# Patient Record
Sex: FEMALE | Race: WHITE | Employment: FULL TIME | URBAN - METROPOLITAN AREA
[De-identification: names, ages, dates, MRNs, and addresses within clinical notes are randomized per-mention and may not be internally consistent; named-entity substitution may affect disease eponyms.]

---

## 2020-12-21 ENCOUNTER — HOSPITAL ENCOUNTER (OUTPATIENT)
Dept: NUTRITION | Age: 30
End: 2020-12-21

## 2021-01-13 ENCOUNTER — HOSPITAL ENCOUNTER (OUTPATIENT)
Dept: NUTRITION | Age: 31
Discharge: HOME | End: 2021-01-13
Payer: COMMERCIAL

## 2021-01-13 PROCEDURE — 97802 MEDICAL NUTRITION INDIV IN: CPT

## 2021-01-13 NOTE — PROGRESS NOTES
..  [address] [place] [address]. [facility], [address] [place] [address]   Nutrition Assessment  Medical Nutrition Therapy   Outpatient Initial Evaluation         Patient Name: Liza Rivero : 1990   Treatment Diagnosis: Irregular menstruation, overweight   Referral Source: Dr. Severo Loser Start of Care [facility]): 2021     Gender: female Age: 27 y.o. Ht: 66 in Wt: 180  lb  kg   BMI: 29.1 BMR   Male  BMR Female [id number]     Past Medical History:  Chronic headaches for the past 1-2 years     Pertinent Medications:   Birth control pills     Biochemical Data:   n/a     Assessment:   The patient is here today for weight loss counseling. She is getting  in October and would like to lose weight before the wedding. She says she exercises 1-2 hours per day, 6-7 days per week (Insanity workout). She had tried portion control and cutting back on eating out in the past and lost weight, but she did not keep the weight off. She also c/o of chronic headaches in the afternoon. She has not seen a doctor about the headaches. She works for [employer] 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: Workout early 6-8 AM   Breakfast (8-9 AM): [employer] shake, [employer] coffee on the way to work (coffe with milk, cold brew or passion fruit guava drink)  Snack (10:30): banana, apple or sometimes something out of vending machine at work (chips)  Lunch (2-3 PM):  Broccoli slaw with chicken or salmon or veggie sub from [employer] (before 7): meat and vegetables   Beverages: 80-90 oz per day of water  Alcohol: socially (once a month)    Problem areas: Not eating enough food in general , not eating carbs, drinking calories in the morning, eating lunch too late       Estimate Needs   Calories: 1217-5325  Protein: 94 Carbs: 169 Fat: 50   Kcal/day  g/day  g/day  g/day        percent: 25  45  30               Nutrition Diagnosis   Overweight related to excessive energy intake as evidenced by BMI of 29.1 Nutrition Intervention &  Education: Educated patient on weight loss diet with plate method guidelines. Encouraged the patient to eat at least 3 times per day, spacing meals no more than 4-5 hours apart (2-3 hours in-between snacks). Discussed that 1/2 the plate should include non-starchy vegetables, 1/4 plate should be lean protein, and 1/4 of plate should be carbohydrate. Provided the patient with list of macro-nutrient sources (CHO, pro, and fat) and appropriate amounts of CHO to be consumed at each meal and snack. Encouraged the patient to try using measuring cups or Calorie [place] sommer to familiarize with appropriate serving sizes. Suggested the patient include protein source with all meals and snacks. Include more non-starchy vegetables and 2 fruit servings per day (eat a variety). Don't drink calories: avoid fruit juice, regular sodas, sweet tea, Gatorade. Eliminate/decrease fast food consumption. Taught patient how to read a food label. We discussed the importance of timing of meals. Discussed importance of 150 minutes per week physical activity.      Handouts Provided: [x]  Carbohydrates  [x]  Protein  [x]  Non-starchy Vegetables  []  Food Label  [x]  Meal and Snack Ideas  []  Food Journals []  Diabetes  []  Cholesterol  []  Sodium  [x]  Gen Nutr Guidelines  []  SBGM Guidelines  [x]  Others: My Healthy Plate   Information Reviewed with: Patient   Readiness to Change Stage: []  Pre-contemplative    []  Contemplative  []  Preparation               [x]  Action                  []  Maintenance   Potential Barriers to Learning: []  Decline in memory    []  Language barrier   []  Other:  []  Emotional                  []  Limited mobility  []  Lack of motivation     [] Vision, hearing or cognitive impairment   Expected Compliance: Good      Nutritional Goal - To promote lifestyle changes to result in:    [x]  Weight loss  []  Improved diabetic control  []  Decreased cholesterol levels  []  Decreased blood pressure  []  Weight maintenance []  Preventing any interactions associated with food allergies  []  Adequate weight gain toward goal weight  []  Other:        Patient Goals:   1. Eat food (rather than a shake) at breakfast  2. Eat on a schedule: eat something with carbs and protein every 4-5 hours, eat a snack if there is a gap between meals greater than 5 hours  3.  Walk after meals for 15 minutes at a time with goal of 30 minutes, 50 days per week      Dietitian Signature: Sanjana Herring RD,[facility] Date: 1/13/2021   Follow-up: February 17, 2021 @ 3:30 PM Time: 4:09 PM

## 2021-03-03 ENCOUNTER — APPOINTMENT (OUTPATIENT)
Dept: NUTRITION | Age: 31
End: 2021-03-03